# Patient Record
Sex: FEMALE | Race: BLACK OR AFRICAN AMERICAN | NOT HISPANIC OR LATINO | URBAN - METROPOLITAN AREA
[De-identification: names, ages, dates, MRNs, and addresses within clinical notes are randomized per-mention and may not be internally consistent; named-entity substitution may affect disease eponyms.]

---

## 2019-01-10 ENCOUNTER — OUTPATIENT (OUTPATIENT)
Dept: OUTPATIENT SERVICES | Facility: HOSPITAL | Age: 69
LOS: 1 days | Discharge: HOME | End: 2019-01-10

## 2019-02-28 ENCOUNTER — OUTPATIENT (OUTPATIENT)
Dept: OUTPATIENT SERVICES | Facility: HOSPITAL | Age: 69
LOS: 1 days | Discharge: HOME | End: 2019-02-28

## 2023-11-20 ENCOUNTER — NON-APPOINTMENT (OUTPATIENT)
Age: 73
End: 2023-11-20

## 2023-12-19 ENCOUNTER — APPOINTMENT (OUTPATIENT)
Dept: OTOLARYNGOLOGY | Facility: CLINIC | Age: 73
End: 2023-12-19
Payer: MEDICARE

## 2023-12-19 VITALS — HEIGHT: 66 IN | BODY MASS INDEX: 19.61 KG/M2 | WEIGHT: 122 LBS

## 2023-12-19 DIAGNOSIS — E04.2 NONTOXIC MULTINODULAR GOITER: ICD-10-CM

## 2023-12-19 PROBLEM — Z00.00 ENCOUNTER FOR PREVENTIVE HEALTH EXAMINATION: Status: ACTIVE | Noted: 2023-12-19

## 2023-12-19 PROCEDURE — 99204 OFFICE O/P NEW MOD 45 MIN: CPT | Mod: 25

## 2023-12-19 PROCEDURE — 31575 DIAGNOSTIC LARYNGOSCOPY: CPT

## 2023-12-20 PROBLEM — E04.2 MULTINODULAR GOITER: Status: ACTIVE | Noted: 2023-12-20

## 2023-12-20 NOTE — DATA REVIEWED
[de-identified] : Thyroid US 11/28/23: FINDINGS:  The thyroid isthmus thickness is 0.3 cm.  The right lobe measures 5.2 x 0.9 x 0.9 cm in sagittal x AP x transverse dimensions.   The left lobe measures 9.0 x 3.8 x 5.6 cm in sagittal x AP x transverse dimensions. Enlarged  Overall thyroid echotexture and blood flow: There is a normal thyroid echotexture and blood flow.  Thyroid nodule assessment: 4  We evaluate up to the four most suspicious nodules as per ACR recommendations. We use the following TI-RADS lexicon:  *Composition: Cystic, almost cystic, spongiform, mixed, almost solid, solid *Echotexture: Anechoic, isoechoic or hyperechoic, hypoechoic, very hypoechoic  *Shape: Wider than tall, taller than wide *Margins: Smooth, ill-defined, lobulated or irregular, extrathyroidal extension *Calcification: Macrocalcifications, peripheral rim calcification, punctate foci  Nodule #1: Location/Morphology/Size: Isthmus. Solid, isoechoic, well-circumscribed, wider than tall with no calcifications measures 1.0 x 0.5 x 0.9 cm, previous remeasured 1.2 x 0.6 x 1.0 cm, stable TR 3  Nodule #2 Location/Morphology/Size: Upper pole left lobe. The nodule is solid, isoechoic, lobulated, wider than tall, the nodule demonstrates cystic areas. The nodule measures 5.6 x 3.0 x 5.6 cm, previously 5.7 x 3.1 x 4.8 with mild increase in size TR 3  Nodule #3: Location/Morphology/Size: Lower pole left lobe. The nodule is solid, hypoechoic lobulated and wider than tall. The nodule measures 4.3 x 2.9 x 4.7 cm, previously 4.2 x 3.4 x 4.8 cm TR 4  Nodule #4: Location/Morphology/Size: Left lobe midpole medially. The nodule is solid, isoechoic, well-circumscribed, wider than tall with no calcifications. The nodule measures 2.2 x 1.3 x 1.8 cm and previously measured 3.4 x 2.1 x 3.8 cm TR 3  There are no suspicious cervical lymph nodes.  IMPRESSION: 1. Enlarged left lobe of the thyroid with multiple nodules the largest nodule which is a TR 3 in the upper pole left lobe has mildly increased in size the other nodules is stable

## 2023-12-20 NOTE — PROCEDURE
[FreeTextEntry3] : Fiberoptic Laryngoscopy: upper airway widely patent TVF fully mobile no masses/lesions

## 2023-12-20 NOTE — ASSESSMENT
[FreeTextEntry1] : 73F here for initial evaluation. For years, she has had a very large thyroid gland. There is no difficulty eating, breathing, swallowing or talking. She underwent ANNE for toxic nodule 8yrs ago. She is clinically euthyroid and does not take synthroid. There is no family h/o thyroid disease. Most recent thyroid sonogram shows a very enlarged left lobe of the thyroid, measuring [id number] with multiple nodules, two of which >4cm, as above, with no suspicious cervical lymph nodes.  On exam, there is an obviously enlarged left lobe of the thyroid w mild deviation of the trachea to the right side. It is nontender and firm. The rest of the head and neck exam, including fiberoptic laryngoscopy, is unremarkable. Left sided multinodular goiter with very large left thyroid lobe, dimensions as above. There are no compressive sx and overall, size of the left lobe has remained more or less the same for >10yrs on prior sonograms. Purely from the size, she easily meets criteria for left thyroid lobectomy. This was discussed at length and all questions were answered. She would like to the think about it and hold off on surgery at this time, which is perfectly reasonable, as it has been stable for years without compressive sx. RTO 6 months, or sooner should anything change in the interim.

## 2023-12-20 NOTE — CONSULT LETTER
[Dear  ___] : Dear  [unfilled], [Courtesy Letter:] : I had the pleasure of seeing your patient, [unfilled], in my office today. [Consult Closing:] : Thank you very much for allowing me to participate in the care of this patient.  If you have any questions, please do not hesitate to contact me. [Sincerely,] : Sincerely, [FreeTextEntry3] : Steve Rodríguez MD Department of Otolaryngology Head and Neck Surgery

## 2023-12-20 NOTE — PHYSICAL EXAM
[FreeTextEntry1] : voice strong [de-identified] : obviously enlarged left lobe of the thyroid w mild deviation of the trachea to the right side. nontender, firm [Midline] : trachea located in midline position [Laryngoscopy Performed] : laryngoscopy was performed, see procedure section for findings [Normal] : no rashes

## 2024-03-19 ENCOUNTER — APPOINTMENT (OUTPATIENT)
Dept: OTOLARYNGOLOGY | Facility: CLINIC | Age: 74
End: 2024-03-19

## 2024-05-08 ENCOUNTER — APPOINTMENT (OUTPATIENT)
Dept: OTOLARYNGOLOGY | Facility: HOSPITAL | Age: 74
End: 2024-05-08

## 2024-05-16 ENCOUNTER — APPOINTMENT (OUTPATIENT)
Dept: OTOLARYNGOLOGY | Facility: CLINIC | Age: 74
End: 2024-05-16

## 2024-07-09 ENCOUNTER — APPOINTMENT (OUTPATIENT)
Dept: OTOLARYNGOLOGY | Facility: CLINIC | Age: 74
End: 2024-07-09
Payer: MEDICARE

## 2024-07-09 DIAGNOSIS — E04.2 NONTOXIC MULTINODULAR GOITER: ICD-10-CM

## 2024-07-09 PROCEDURE — 99213 OFFICE O/P EST LOW 20 MIN: CPT

## 2024-11-03 ENCOUNTER — EMERGENCY (EMERGENCY)
Facility: HOSPITAL | Age: 74
LOS: 0 days | Discharge: HOME | End: 2024-11-03
Attending: STUDENT IN AN ORGANIZED HEALTH CARE EDUCATION/TRAINING PROGRAM
Payer: MEDICARE

## 2024-11-03 VITALS
DIASTOLIC BLOOD PRESSURE: 59 MMHG | TEMPERATURE: 98 F | RESPIRATION RATE: 18 BRPM | OXYGEN SATURATION: 100 % | SYSTOLIC BLOOD PRESSURE: 131 MMHG | HEART RATE: 56 BPM

## 2024-11-03 VITALS
SYSTOLIC BLOOD PRESSURE: 146 MMHG | RESPIRATION RATE: 18 BRPM | DIASTOLIC BLOOD PRESSURE: 70 MMHG | HEIGHT: 62 IN | OXYGEN SATURATION: 100 % | WEIGHT: 123.02 LBS | TEMPERATURE: 98 F | HEART RATE: 74 BPM

## 2024-11-03 DIAGNOSIS — R07.9 CHEST PAIN, UNSPECIFIED: ICD-10-CM

## 2024-11-03 DIAGNOSIS — I10 ESSENTIAL (PRIMARY) HYPERTENSION: ICD-10-CM

## 2024-11-03 DIAGNOSIS — R07.89 OTHER CHEST PAIN: ICD-10-CM

## 2024-11-03 LAB
ALBUMIN SERPL ELPH-MCNC: 3.8 G/DL — SIGNIFICANT CHANGE UP (ref 3.3–5)
ALP SERPL-CCNC: 91 U/L — SIGNIFICANT CHANGE UP (ref 40–120)
ALT FLD-CCNC: 45 U/L — SIGNIFICANT CHANGE UP (ref 12–78)
ANION GAP SERPL CALC-SCNC: 7 MMOL/L — SIGNIFICANT CHANGE UP (ref 5–17)
AST SERPL-CCNC: 36 U/L — SIGNIFICANT CHANGE UP (ref 15–37)
BASOPHILS # BLD AUTO: 0.02 K/UL — SIGNIFICANT CHANGE UP (ref 0–0.2)
BASOPHILS NFR BLD AUTO: 0.3 % — SIGNIFICANT CHANGE UP (ref 0–2)
BILIRUB SERPL-MCNC: 1.1 MG/DL — SIGNIFICANT CHANGE UP (ref 0.2–1.2)
BUN SERPL-MCNC: 20 MG/DL — SIGNIFICANT CHANGE UP (ref 7–23)
CALCIUM SERPL-MCNC: 9.1 MG/DL — SIGNIFICANT CHANGE UP (ref 8.5–10.1)
CHLORIDE SERPL-SCNC: 108 MMOL/L — SIGNIFICANT CHANGE UP (ref 96–108)
CO2 SERPL-SCNC: 27 MMOL/L — SIGNIFICANT CHANGE UP (ref 22–31)
CREAT SERPL-MCNC: 0.86 MG/DL — SIGNIFICANT CHANGE UP (ref 0.5–1.3)
EGFR: 71 ML/MIN/1.73M2 — SIGNIFICANT CHANGE UP
EOSINOPHIL # BLD AUTO: 0.1 K/UL — SIGNIFICANT CHANGE UP (ref 0–0.5)
EOSINOPHIL NFR BLD AUTO: 1.6 % — SIGNIFICANT CHANGE UP (ref 0–6)
GLUCOSE SERPL-MCNC: 98 MG/DL — SIGNIFICANT CHANGE UP (ref 70–99)
HCT VFR BLD CALC: 43.7 % — SIGNIFICANT CHANGE UP (ref 34.5–45)
HGB BLD-MCNC: 14.8 G/DL — SIGNIFICANT CHANGE UP (ref 11.5–15.5)
IMM GRANULOCYTES NFR BLD AUTO: 0.3 % — SIGNIFICANT CHANGE UP (ref 0–0.9)
LYMPHOCYTES # BLD AUTO: 1.42 K/UL — SIGNIFICANT CHANGE UP (ref 1–3.3)
LYMPHOCYTES # BLD AUTO: 22.2 % — SIGNIFICANT CHANGE UP (ref 13–44)
MCHC RBC-ENTMCNC: 29.5 PG — SIGNIFICANT CHANGE UP (ref 27–34)
MCHC RBC-ENTMCNC: 33.9 G/DL — SIGNIFICANT CHANGE UP (ref 32–36)
MCV RBC AUTO: 87.1 FL — SIGNIFICANT CHANGE UP (ref 80–100)
MONOCYTES # BLD AUTO: 0.45 K/UL — SIGNIFICANT CHANGE UP (ref 0–0.9)
MONOCYTES NFR BLD AUTO: 7 % — SIGNIFICANT CHANGE UP (ref 2–14)
NEUTROPHILS # BLD AUTO: 4.38 K/UL — SIGNIFICANT CHANGE UP (ref 1.8–7.4)
NEUTROPHILS NFR BLD AUTO: 68.6 % — SIGNIFICANT CHANGE UP (ref 43–77)
NRBC # BLD: 0 /100 WBCS — SIGNIFICANT CHANGE UP (ref 0–0)
PLATELET # BLD AUTO: 185 K/UL — SIGNIFICANT CHANGE UP (ref 150–400)
POTASSIUM SERPL-MCNC: 4.1 MMOL/L — SIGNIFICANT CHANGE UP (ref 3.5–5.3)
POTASSIUM SERPL-SCNC: 4.1 MMOL/L — SIGNIFICANT CHANGE UP (ref 3.5–5.3)
PROT SERPL-MCNC: 7.2 GM/DL — SIGNIFICANT CHANGE UP (ref 6–8.3)
RBC # BLD: 5.02 M/UL — SIGNIFICANT CHANGE UP (ref 3.8–5.2)
RBC # FLD: 12.7 % — SIGNIFICANT CHANGE UP (ref 10.3–14.5)
SODIUM SERPL-SCNC: 142 MMOL/L — SIGNIFICANT CHANGE UP (ref 135–145)
TROPONIN I, HIGH SENSITIVITY RESULT: 7 NG/L — SIGNIFICANT CHANGE UP
WBC # BLD: 6.39 K/UL — SIGNIFICANT CHANGE UP (ref 3.8–10.5)
WBC # FLD AUTO: 6.39 K/UL — SIGNIFICANT CHANGE UP (ref 3.8–10.5)

## 2024-11-03 PROCEDURE — 93010 ELECTROCARDIOGRAM REPORT: CPT

## 2024-11-03 PROCEDURE — 99285 EMERGENCY DEPT VISIT HI MDM: CPT

## 2024-11-03 PROCEDURE — 71045 X-RAY EXAM CHEST 1 VIEW: CPT | Mod: 26

## 2024-11-03 NOTE — ED PROVIDER NOTE - CLINICAL SUMMARY MEDICAL DECISION MAKING FREE TEXT BOX
This patient is a 74-year-old female presenting to the emergency department today for chest tightness.  CBC shows no evidence of leukocytosis.  No acute anemia.  No thrombocytopenia.  CMP shows no significant electrode abnormalities.  No GEOVANI.  No elevated LFTs.  Troponin is negative.  No acute ischemic changes on EKG.  Minimal suspicion for ACS.  The patient has no shortness of breath which minimizes suspicion for pulmonary embolism.  She has no sharp tearing chest pain but does not go to her back.  Minimal suspicion for dissection.  Chest x-ray shows no pneumonia nor pneumothoraces.  EKG shows no arrhythmias.    On my reevaluation, patient is resting comfortably in the room.  States that her pulling sensation is completely improved while resting in the emergency department.  At this time, we believe that she is safe for discharge to home with outpatient follow-up with her PCP.  She expresses understanding and is amenable to this plan.  She is well-appearing and nontoxic at this time.

## 2024-11-03 NOTE — ED PROVIDER NOTE - PATIENT PORTAL LINK FT
You can access the FollowMyHealth Patient Portal offered by [facility] by registering at the following website: [email or web address]. By joining [employer]’s FollowMyHealth portal, you will also be able to view your health information using other applications (apps) compatible with our system.

## 2024-11-03 NOTE — ED PROVIDER NOTE - NS ED ROS FT
CONSTITUTIONAL: No weight loss, fever, chills, weakness or fatigue.    HEENT:    Eyes: No visual loss, blurred vision, double vision or yellow sclerae.  Ears, Nose, Throat: No hearing loss, sneezing, congestion, runny nose or sore throat.    CARDIOVASCULAR: No chest pain or chest pressure.  Positive for chest discomfort. No palpitations or edema.    RESPIRATORY: No shortness of breath, cough or sputum.    GASTROINTESTINAL: No anorexia, nausea, vomiting or diarrhea. No abdominal pain or blood.    SKIN: No rash or itching.    GENITOURINARY: Patient denies any changes to bowel or bladder function.    NEUROLOGICAL: No headache, dizziness, syncope, paralysis, ataxia, numbness or tingling in the extremities. No change in bowel or bladder control.    MUSCULOSKELETAL: No muscle, back pain, joint pain or stiffness.    PSYCHIATRIC: No suicidal or homicidal ideation.

## 2024-11-03 NOTE — ED PROVIDER NOTE - OBJECTIVE STATEMENT
Patient is a 74-year-old female presenting to the emergency room today with complaints of chest pain.  Past medical history of hypertension.  No other relevant past medical history.  States that 2 weeks ago she started feeling a pulling sensation over her right chest.  When she does not think about the sensation it goes away and she has no pain.  Intermittently over the last several days she has had episodes of this pulling sensation that lasts only several minutes.  She states that today she was doing her stretches and started noticing this pulling sensation again.  Since then it has continued.  Now and for this reason she came to the emergency department for further evaluation.  She does not have any pain.  No shortness of breath.  No nausea or vomiting.  No constipation or diarrhea.  No fevers or chills.  No other complaints at this time

## 2024-11-03 NOTE — ED ADULT TRIAGE NOTE - CHIEF COMPLAINT QUOTE
pt c/o right chest pain radiating down right arm with weakness started today after gym. pt states has similar symptoms over past 2 weeks. hx: Aortic Insufficiency

## 2024-11-03 NOTE — ED ADULT NURSE NOTE - OBJECTIVE STATEMENT
73 yo female, A&Ox4, ambulatory with steady gait, presents to ED c/o R shoulder pain that radiates to R chest. Patient states she was carrying her groceries inside her home when she felt a muscle pull on the right shoulder, she reports has been having intermittent pain. Denies sob, dizziness, diaphoresis.

## 2024-11-03 NOTE — ED PROVIDER NOTE - CCCP TRG CHIEF CMPLNT
Patient called supper upset she will like to change the pharmacy on file to  [address], [place] [address] and to remove the [address]  [facility] [id number] 
Pharmacy has been updated.     Please advise if ok to fill?  
chest pain

## 2025-01-28 ENCOUNTER — APPOINTMENT (OUTPATIENT)
Dept: OTOLARYNGOLOGY | Facility: CLINIC | Age: 75
End: 2025-01-28
Payer: MEDICARE

## 2025-01-28 ENCOUNTER — NON-APPOINTMENT (OUTPATIENT)
Age: 75
End: 2025-01-28

## 2025-01-28 VITALS — BODY MASS INDEX: 19.29 KG/M2 | HEIGHT: 66 IN | WEIGHT: 120 LBS

## 2025-01-28 DIAGNOSIS — E04.2 NONTOXIC MULTINODULAR GOITER: ICD-10-CM

## 2025-01-28 PROCEDURE — 99213 OFFICE O/P EST LOW 20 MIN: CPT

## 2025-07-29 ENCOUNTER — APPOINTMENT (OUTPATIENT)
Dept: OTOLARYNGOLOGY | Facility: CLINIC | Age: 75
End: 2025-07-29
Payer: MEDICARE

## 2025-07-29 VITALS — HEIGHT: 63 IN | WEIGHT: 119 LBS | BODY MASS INDEX: 21.09 KG/M2

## 2025-07-29 DIAGNOSIS — E04.2 NONTOXIC MULTINODULAR GOITER: ICD-10-CM

## 2025-07-29 PROCEDURE — 99213 OFFICE O/P EST LOW 20 MIN: CPT
